# Patient Record
Sex: FEMALE | Race: WHITE
[De-identification: names, ages, dates, MRNs, and addresses within clinical notes are randomized per-mention and may not be internally consistent; named-entity substitution may affect disease eponyms.]

---

## 2020-02-27 ENCOUNTER — HOSPITAL ENCOUNTER (OUTPATIENT)
Dept: HOSPITAL 92 - BICMAMMO | Age: 45
Discharge: HOME | End: 2020-02-27
Attending: FAMILY MEDICINE
Payer: COMMERCIAL

## 2020-02-27 DIAGNOSIS — N63.20: ICD-10-CM

## 2020-02-27 DIAGNOSIS — Z12.31: Primary | ICD-10-CM

## 2020-02-27 PROCEDURE — 77063 BREAST TOMOSYNTHESIS BI: CPT

## 2020-02-27 PROCEDURE — 77067 SCR MAMMO BI INCL CAD: CPT

## 2020-03-16 NOTE — MMO
Bilateral MAMMO Bilat Screen [facility].

 

CLINICAL HISTORY:

Patient is 44 years old and is seen for screening. The patient has no family

history of breast cancer.  The patient has no personal history of cancer.

 

VIEWS:

The views performed were:  bilateral craniocaudal with tomosynthesis and

bilateral mediolateral oblique with tomosynthesis.

 

This study has been interpreted with the assistance of computer-aided detection.

 

MAMMOGRAM FINDINGS:

The breasts are heterogeneously dense, which could obscure a lesion on

mammography.

 

There is a round mass measuring 13 millimeters with circumscribed margins seen

in the lower-outer region of the left breast.

 

In the right breast, there are no suspicious masses, calcifications or areas of

architectural distortion.

 

IMPRESSION:

MASS IN THE LEFT BREAST REQUIRES ADDITIONAL EVALUATION. AN ULTRASOUND EXAM IS

RECOMMENDED.

 

THE RESULTS OF THIS EXAM WERE SENT TO THE PATIENT.

 

ACR BI-RADS Category 0 - Incomplete:  Need additional imaging evaluation. [facility] will notify the patient of the need for additional imaging services.

 

MAMMOGRAPHY NOTE:

 1. A negative mammogram report should not delay a biopsy if a dominant of

 clinically suspicious mass is present.

 2. Approximately 10% to 15% of breast cancers are not detected by

 mammography.

 3. Adenosis and dense breasts may obscure an underlying neoplasm.

 

 

Reported by: DELON RAMOS MD

Electonically Signed: [id number]

## 2020-05-20 ENCOUNTER — HOSPITAL ENCOUNTER (OUTPATIENT)
Dept: HOSPITAL 92 - BICULT | Age: 45
Discharge: HOME | End: 2020-05-20
Attending: FAMILY MEDICINE
Payer: COMMERCIAL

## 2020-05-20 DIAGNOSIS — N60.02: ICD-10-CM

## 2020-05-20 DIAGNOSIS — N63.23: Primary | ICD-10-CM

## 2020-05-20 NOTE — ULT
EXAM:

US Breast Limited Lt



PROVIDED CLINICAL HISTORY:

Abnormal screening mammogram



COMPARISON:

Screening mammogram 2/27/2020



FINDINGS:

Limited sonographic interrogation was performed of the lower outer left breast in the region of mammo
graphic concern. There are 2 adjacent simple cysts at the 4:00 position of the left breast, in

aggregate measuring about 1.4 cm. This corresponds to the mammogram finding. No concerning sonographi
c abnormalities are evident.



IMPRESSION:

Ultrasound reveals simple cysts corresponding to the mammogram finding. Return to annual screening ma
mmography recommended.



BI-RADS 2 -- benign findings



Reported By: Abram Crawford 

Electronically Signed:  5/20/2020 11:06 AM

## 2023-10-16 ENCOUNTER — HOSPITAL ENCOUNTER (OUTPATIENT)
Dept: HOSPITAL 92 - CSHMAMMO | Age: 48
Discharge: HOME | End: 2023-10-16
Attending: FAMILY MEDICINE
Payer: COMMERCIAL

## 2023-10-16 DIAGNOSIS — Z12.31: Primary | ICD-10-CM

## 2023-10-16 PROCEDURE — 77067 SCR MAMMO BI INCL CAD: CPT

## 2023-10-16 PROCEDURE — 77063 BREAST TOMOSYNTHESIS BI: CPT
